# Patient Record
Sex: FEMALE | Race: BLACK OR AFRICAN AMERICAN | Employment: OTHER | URBAN - METROPOLITAN AREA
[De-identification: names, ages, dates, MRNs, and addresses within clinical notes are randomized per-mention and may not be internally consistent; named-entity substitution may affect disease eponyms.]

---

## 2018-05-30 ENCOUNTER — HOSPITAL ENCOUNTER (INPATIENT)
Age: 48
LOS: 2 days | Discharge: HOME | DRG: 760 | End: 2018-06-01
Attending: INTERNAL MEDICINE | Admitting: INTERNAL MEDICINE
Payer: MEDICARE

## 2018-05-30 ENCOUNTER — APPOINTMENT (OUTPATIENT)
Dept: GENERAL RADIOLOGY | Age: 48
DRG: 760 | End: 2018-05-30
Payer: MEDICARE

## 2018-05-30 DIAGNOSIS — R07.9 CHEST PAIN, UNSPECIFIED TYPE: ICD-10-CM

## 2018-05-30 DIAGNOSIS — F41.1 ANXIETY STATE: ICD-10-CM

## 2018-05-30 DIAGNOSIS — D64.9 ANEMIA, UNSPECIFIED TYPE: Primary | ICD-10-CM

## 2018-05-30 DIAGNOSIS — R06.02 SHORTNESS OF BREATH: ICD-10-CM

## 2018-05-30 LAB
ANION GAP SERPL CALCULATED.3IONS-SCNC: 15 MEQ/L (ref 7–13)
BACTERIA: ABNORMAL /HPF
BILIRUBIN URINE: NEGATIVE
BLOOD, URINE: ABNORMAL
BUN BLDV-MCNC: 17 MG/DL (ref 6–20)
CALCIUM SERPL-MCNC: 8.1 MG/DL (ref 8.6–10.2)
CHLORIDE BLD-SCNC: 101 MEQ/L (ref 98–107)
CLARITY: CLEAR
CO2: 23 MEQ/L (ref 22–29)
COLOR: YELLOW
CREAT SERPL-MCNC: 1.25 MG/DL (ref 0.5–0.9)
EKG ATRIAL RATE: 84 BPM
EKG P AXIS: 66 DEGREES
EKG P-R INTERVAL: 146 MS
EKG Q-T INTERVAL: 390 MS
EKG QRS DURATION: 96 MS
EKG QTC CALCULATION (BAZETT): 460 MS
EKG R AXIS: 46 DEGREES
EKG T AXIS: 57 DEGREES
EKG VENTRICULAR RATE: 84 BPM
EPITHELIAL CELLS, UA: ABNORMAL /HPF
GFR AFRICAN AMERICAN: 55.2
GFR NON-AFRICAN AMERICAN: 45.6
GLUCOSE BLD-MCNC: 93 MG/DL (ref 74–109)
GLUCOSE URINE: NEGATIVE MG/DL
KETONES, URINE: NEGATIVE MG/DL
LEUKOCYTE ESTERASE, URINE: NEGATIVE
NITRITE, URINE: NEGATIVE
PH UA: 5.5 (ref 5–9)
POTASSIUM SERPL-SCNC: 3.5 MEQ/L (ref 3.5–5.1)
PROTEIN UA: ABNORMAL MG/DL
RBC UA: ABNORMAL /HPF (ref 0–2)
RENAL EPITHELIAL, UA: ABNORMAL /HPF
SODIUM BLD-SCNC: 139 MEQ/L (ref 132–144)
SPECIFIC GRAVITY UA: 1.01 (ref 1–1.03)
TROPONIN: <0.01 NG/ML (ref 0–0.01)
URINE REFLEX TO CULTURE: YES
UROBILINOGEN, URINE: 0.2 E.U./DL
WBC UA: ABNORMAL /HPF (ref 0–5)

## 2018-05-30 PROCEDURE — 86901 BLOOD TYPING SEROLOGIC RH(D): CPT

## 2018-05-30 PROCEDURE — 36415 COLL VENOUS BLD VENIPUNCTURE: CPT

## 2018-05-30 PROCEDURE — 87077 CULTURE AEROBIC IDENTIFY: CPT

## 2018-05-30 PROCEDURE — 85025 COMPLETE CBC W/AUTO DIFF WBC: CPT

## 2018-05-30 PROCEDURE — 86850 RBC ANTIBODY SCREEN: CPT

## 2018-05-30 PROCEDURE — 80048 BASIC METABOLIC PNL TOTAL CA: CPT

## 2018-05-30 PROCEDURE — 99284 EMERGENCY DEPT VISIT MOD MDM: CPT

## 2018-05-30 PROCEDURE — 1210000000 HC MED SURG R&B

## 2018-05-30 PROCEDURE — 87086 URINE CULTURE/COLONY COUNT: CPT

## 2018-05-30 PROCEDURE — 84484 ASSAY OF TROPONIN QUANT: CPT

## 2018-05-30 PROCEDURE — 86900 BLOOD TYPING SEROLOGIC ABO: CPT

## 2018-05-30 PROCEDURE — 93005 ELECTROCARDIOGRAM TRACING: CPT

## 2018-05-30 PROCEDURE — 71046 X-RAY EXAM CHEST 2 VIEWS: CPT

## 2018-05-30 PROCEDURE — 6370000000 HC RX 637 (ALT 250 FOR IP): Performed by: NURSE PRACTITIONER

## 2018-05-30 PROCEDURE — 81001 URINALYSIS AUTO W/SCOPE: CPT

## 2018-05-30 RX ORDER — 0.9 % SODIUM CHLORIDE 0.9 %
250 INTRAVENOUS SOLUTION INTRAVENOUS ONCE
Status: COMPLETED | OUTPATIENT
Start: 2018-05-30 | End: 2018-05-31

## 2018-05-30 RX ORDER — LISINOPRIL 2.5 MG/1
2.5 TABLET ORAL DAILY
COMMUNITY
End: 2018-10-29 | Stop reason: SDUPTHER

## 2018-05-30 RX ORDER — ALPRAZOLAM 0.5 MG/1
0.5 TABLET ORAL ONCE
Status: COMPLETED | OUTPATIENT
Start: 2018-05-30 | End: 2018-05-30

## 2018-05-30 RX ADMIN — ALPRAZOLAM 0.5 MG: 0.5 TABLET ORAL at 21:57

## 2018-05-30 ASSESSMENT — ENCOUNTER SYMPTOMS
BACK PAIN: 0
COUGH: 0
TROUBLE SWALLOWING: 0
ABDOMINAL PAIN: 0
DIARRHEA: 0
SHORTNESS OF BREATH: 1
VOICE CHANGE: 0
COLOR CHANGE: 0
VOMITING: 0
CONSTIPATION: 0
SORE THROAT: 0
NAUSEA: 0

## 2018-05-30 ASSESSMENT — HEART SCORE: ECG: 0

## 2018-05-31 PROBLEM — D50.0 IRON DEFICIENCY ANEMIA DUE TO CHRONIC BLOOD LOSS: Status: ACTIVE | Noted: 2018-05-31

## 2018-05-31 PROBLEM — N92.0 MENORRHAGIA: Status: ACTIVE | Noted: 2018-05-31

## 2018-05-31 LAB
ABO/RH: NORMAL
ANION GAP SERPL CALCULATED.3IONS-SCNC: 15 MEQ/L (ref 7–13)
ANISOCYTOSIS: ABNORMAL
ANISOCYTOSIS: ABNORMAL
ANTIBODY SCREEN: NORMAL
BASOPHILS ABSOLUTE: 0 K/UL (ref 0–0.2)
BASOPHILS ABSOLUTE: 0.1 K/UL (ref 0–0.2)
BASOPHILS RELATIVE PERCENT: 1 %
BASOPHILS RELATIVE PERCENT: 1 %
BLOOD BANK DISPENSE STATUS: NORMAL
BLOOD BANK DISPENSE STATUS: NORMAL
BLOOD BANK PRODUCT CODE: NORMAL
BLOOD BANK PRODUCT CODE: NORMAL
BPU ID: NORMAL
BPU ID: NORMAL
BUN BLDV-MCNC: 16 MG/DL (ref 6–20)
CALCIUM SERPL-MCNC: 8.7 MG/DL (ref 8.6–10.2)
CHLORIDE BLD-SCNC: 104 MEQ/L (ref 98–107)
CO2: 23 MEQ/L (ref 22–29)
CREAT SERPL-MCNC: 1.29 MG/DL (ref 0.5–0.9)
DESCRIPTION BLOOD BANK: NORMAL
DESCRIPTION BLOOD BANK: NORMAL
EOSINOPHILS ABSOLUTE: 0 K/UL (ref 0–0.7)
EOSINOPHILS ABSOLUTE: 0.4 K/UL (ref 0–0.7)
EOSINOPHILS RELATIVE PERCENT: 2.2 %
EOSINOPHILS RELATIVE PERCENT: 5 %
GFR AFRICAN AMERICAN: 53.3
GFR NON-AFRICAN AMERICAN: 44
GLUCOSE BLD-MCNC: 104 MG/DL (ref 74–109)
HCT VFR BLD CALC: 24.3 % (ref 37–47)
HCT VFR BLD CALC: 25 % (ref 37–47)
HEMOGLOBIN: 7.4 G/DL (ref 12–16)
HEMOGLOBIN: 7.8 G/DL (ref 12–16)
HYPOCHROMIA: ABNORMAL
HYPOCHROMIA: ABNORMAL
LYMPHOCYTES ABSOLUTE: 1.8 K/UL (ref 1–4.8)
LYMPHOCYTES ABSOLUTE: 1.9 K/UL (ref 1–4.8)
LYMPHOCYTES RELATIVE PERCENT: 19 %
LYMPHOCYTES RELATIVE PERCENT: 23 %
MCH RBC QN AUTO: 18.1 PG (ref 27–31.3)
MCH RBC QN AUTO: 19.4 PG (ref 27–31.3)
MCHC RBC AUTO-ENTMCNC: 30.3 % (ref 33–37)
MCHC RBC AUTO-ENTMCNC: 31.1 % (ref 33–37)
MCV RBC AUTO: 59.6 FL (ref 82–100)
MCV RBC AUTO: 62.5 FL (ref 82–100)
MICROCYTES: ABNORMAL
MICROCYTES: ABNORMAL
MONOCYTES ABSOLUTE: 0.4 K/UL (ref 0.2–0.8)
MONOCYTES ABSOLUTE: 0.5 K/UL (ref 0.2–0.8)
MONOCYTES RELATIVE PERCENT: 3.6 %
MONOCYTES RELATIVE PERCENT: 5.7 %
NEUTROPHILS ABSOLUTE: 5.2 K/UL (ref 1.4–6.5)
NEUTROPHILS ABSOLUTE: 7.8 K/UL (ref 1.4–6.5)
NEUTROPHILS RELATIVE PERCENT: 66 %
NEUTROPHILS RELATIVE PERCENT: 78 %
OVALOCYTES: ABNORMAL
PDW BLD-RTO: 21.6 % (ref 11.5–14.5)
PDW BLD-RTO: 24.9 % (ref 11.5–14.5)
PLATELET # BLD: 288 K/UL (ref 130–400)
PLATELET # BLD: 292 K/UL (ref 130–400)
PLATELET SLIDE REVIEW: ADEQUATE
POIKILOCYTES: ABNORMAL
POIKILOCYTES: ABNORMAL
POTASSIUM REFLEX MAGNESIUM: 3.8 MEQ/L (ref 3.5–5.1)
RBC # BLD: 4 M/UL (ref 4.2–5.4)
RBC # BLD: 4.08 M/UL (ref 4.2–5.4)
SLIDE REVIEW: ABNORMAL
SODIUM BLD-SCNC: 142 MEQ/L (ref 132–144)
TEAR DROP CELLS: ABNORMAL
WBC # BLD: 10 K/UL (ref 4.8–10.8)
WBC # BLD: 7.9 K/UL (ref 4.8–10.8)

## 2018-05-31 PROCEDURE — 80048 BASIC METABOLIC PNL TOTAL CA: CPT

## 2018-05-31 PROCEDURE — 36415 COLL VENOUS BLD VENIPUNCTURE: CPT

## 2018-05-31 PROCEDURE — 6370000000 HC RX 637 (ALT 250 FOR IP): Performed by: INTERNAL MEDICINE

## 2018-05-31 PROCEDURE — 85025 COMPLETE CBC W/AUTO DIFF WBC: CPT

## 2018-05-31 PROCEDURE — 86923 COMPATIBILITY TEST ELECTRIC: CPT

## 2018-05-31 PROCEDURE — 36430 TRANSFUSION BLD/BLD COMPNT: CPT

## 2018-05-31 PROCEDURE — 1210000000 HC MED SURG R&B

## 2018-05-31 PROCEDURE — 2580000003 HC RX 258: Performed by: NURSE PRACTITIONER

## 2018-05-31 PROCEDURE — 2580000003 HC RX 258: Performed by: INTERNAL MEDICINE

## 2018-05-31 PROCEDURE — P9016 RBC LEUKOCYTES REDUCED: HCPCS

## 2018-05-31 PROCEDURE — 93010 ELECTROCARDIOGRAM REPORT: CPT | Performed by: INTERNAL MEDICINE

## 2018-05-31 RX ORDER — GUAIFENESIN 400 MG/1
400 TABLET ORAL 2 TIMES DAILY PRN
COMMUNITY
End: 2018-08-09 | Stop reason: ALTCHOICE

## 2018-05-31 RX ORDER — FLUTICASONE PROPIONATE 50 MCG
1 SPRAY, SUSPENSION (ML) NASAL DAILY
Status: DISCONTINUED | OUTPATIENT
Start: 2018-05-31 | End: 2018-06-01 | Stop reason: HOSPADM

## 2018-05-31 RX ORDER — BISACODYL 10 MG
10 SUPPOSITORY, RECTAL RECTAL DAILY PRN
Status: DISCONTINUED | OUTPATIENT
Start: 2018-05-31 | End: 2018-06-01 | Stop reason: HOSPADM

## 2018-05-31 RX ORDER — ACETAMINOPHEN 325 MG/1
650 TABLET ORAL EVERY 6 HOURS PRN
Status: DISCONTINUED | OUTPATIENT
Start: 2018-05-31 | End: 2018-06-01 | Stop reason: HOSPADM

## 2018-05-31 RX ORDER — 0.9 % SODIUM CHLORIDE 0.9 %
250 INTRAVENOUS SOLUTION INTRAVENOUS ONCE
Status: COMPLETED | OUTPATIENT
Start: 2018-05-31 | End: 2018-06-01

## 2018-05-31 RX ORDER — FERROUS SULFATE 325(65) MG
325 TABLET ORAL
Qty: 30 TABLET | Refills: 3 | Status: SHIPPED | OUTPATIENT
Start: 2018-05-31 | End: 2018-06-01

## 2018-05-31 RX ORDER — DOCUSATE SODIUM 100 MG/1
100 CAPSULE, LIQUID FILLED ORAL DAILY
Status: DISCONTINUED | OUTPATIENT
Start: 2018-06-01 | End: 2018-06-01 | Stop reason: HOSPADM

## 2018-05-31 RX ORDER — SODIUM PHOSPHATE, DIBASIC AND SODIUM PHOSPHATE, MONOBASIC 7; 19 G/133ML; G/133ML
1 ENEMA RECTAL DAILY PRN
Status: DISCONTINUED | OUTPATIENT
Start: 2018-05-31 | End: 2018-06-01 | Stop reason: HOSPADM

## 2018-05-31 RX ORDER — LISINOPRIL 5 MG/1
5 TABLET ORAL DAILY
Status: DISCONTINUED | OUTPATIENT
Start: 2018-05-31 | End: 2018-06-01 | Stop reason: HOSPADM

## 2018-05-31 RX ORDER — ONDANSETRON 2 MG/ML
4 INJECTION INTRAMUSCULAR; INTRAVENOUS EVERY 6 HOURS PRN
Status: DISCONTINUED | OUTPATIENT
Start: 2018-05-31 | End: 2018-06-01 | Stop reason: HOSPADM

## 2018-05-31 RX ORDER — HYDRALAZINE HYDROCHLORIDE 20 MG/ML
10 INJECTION INTRAMUSCULAR; INTRAVENOUS EVERY 4 HOURS PRN
Status: DISCONTINUED | OUTPATIENT
Start: 2018-05-31 | End: 2018-06-01 | Stop reason: HOSPADM

## 2018-05-31 RX ADMIN — FLUTICASONE PROPIONATE 1 SPRAY: 50 SPRAY, METERED NASAL at 09:32

## 2018-05-31 RX ADMIN — MAGNESIUM HYDROXIDE 30 ML: 400 SUSPENSION ORAL at 23:49

## 2018-05-31 RX ADMIN — SODIUM CHLORIDE 250 ML: 9 INJECTION, SOLUTION INTRAVENOUS at 03:30

## 2018-05-31 RX ADMIN — SODIUM CHLORIDE 250 ML: 9 INJECTION, SOLUTION INTRAVENOUS at 13:26

## 2018-05-31 RX ADMIN — ACETAMINOPHEN 650 MG: 325 TABLET ORAL at 23:48

## 2018-05-31 RX ADMIN — LISINOPRIL 5 MG: 5 TABLET ORAL at 09:29

## 2018-05-31 ASSESSMENT — PAIN SCALES - GENERAL
PAINLEVEL_OUTOF10: 0
PAINLEVEL_OUTOF10: 3

## 2018-06-01 VITALS
HEIGHT: 66 IN | HEART RATE: 79 BPM | TEMPERATURE: 98.4 F | DIASTOLIC BLOOD PRESSURE: 65 MMHG | RESPIRATION RATE: 16 BRPM | OXYGEN SATURATION: 100 % | SYSTOLIC BLOOD PRESSURE: 116 MMHG | WEIGHT: 225.31 LBS | BODY MASS INDEX: 36.21 KG/M2

## 2018-06-01 LAB
ANISOCYTOSIS: ABNORMAL
BASOPHILS ABSOLUTE: 0.1 K/UL (ref 0–0.2)
BASOPHILS RELATIVE PERCENT: 1 %
EOSINOPHILS ABSOLUTE: 0.2 K/UL (ref 0–0.7)
EOSINOPHILS RELATIVE PERCENT: 3 %
HCT VFR BLD CALC: 26.9 % (ref 37–47)
HEMOGLOBIN: 8.7 G/DL (ref 12–16)
HYPOCHROMIA: ABNORMAL
LYMPHOCYTES ABSOLUTE: 2 K/UL (ref 1–4.8)
LYMPHOCYTES RELATIVE PERCENT: 25 %
MCH RBC QN AUTO: 21.1 PG (ref 27–31.3)
MCHC RBC AUTO-ENTMCNC: 32.5 % (ref 33–37)
MCV RBC AUTO: 64.8 FL (ref 82–100)
MICROCYTES: ABNORMAL
MONOCYTES ABSOLUTE: 0.1 K/UL (ref 0.2–0.8)
MONOCYTES RELATIVE PERCENT: 0.9 %
NEUTROPHILS ABSOLUTE: 5.6 K/UL (ref 1.4–6.5)
NEUTROPHILS RELATIVE PERCENT: 71 %
ORGANISM: ABNORMAL
PDW BLD-RTO: 27 % (ref 11.5–14.5)
PLATELET # BLD: 250 K/UL (ref 130–400)
PLATELET SLIDE REVIEW: NORMAL
POIKILOCYTES: ABNORMAL
POLYCHROMASIA: ABNORMAL
RBC # BLD: 4.14 M/UL (ref 4.2–5.4)
TEAR DROP CELLS: ABNORMAL
URINE CULTURE, ROUTINE: ABNORMAL
URINE CULTURE, ROUTINE: ABNORMAL
WBC # BLD: 7.9 K/UL (ref 4.8–10.8)

## 2018-06-01 PROCEDURE — 85025 COMPLETE CBC W/AUTO DIFF WBC: CPT

## 2018-06-01 PROCEDURE — 36415 COLL VENOUS BLD VENIPUNCTURE: CPT

## 2018-06-01 PROCEDURE — 6360000002 HC RX W HCPCS: Performed by: INTERNAL MEDICINE

## 2018-06-01 PROCEDURE — 2580000003 HC RX 258: Performed by: INTERNAL MEDICINE

## 2018-06-01 PROCEDURE — 6370000000 HC RX 637 (ALT 250 FOR IP): Performed by: INTERNAL MEDICINE

## 2018-06-01 RX ORDER — FLUTICASONE PROPIONATE 50 MCG
1 SPRAY, SUSPENSION (ML) NASAL DAILY
Qty: 1 BOTTLE | Refills: 1 | Status: SHIPPED | OUTPATIENT
Start: 2018-06-01 | End: 2019-02-07

## 2018-06-01 RX ORDER — FERROUS SULFATE 325(65) MG
325 TABLET ORAL 2 TIMES DAILY
Qty: 30 TABLET | Refills: 3 | Status: SHIPPED | OUTPATIENT
Start: 2018-06-01 | End: 2018-11-28 | Stop reason: SDUPTHER

## 2018-06-01 RX ADMIN — SODIUM CHLORIDE 125 MG: 9 INJECTION, SOLUTION INTRAVENOUS at 06:35

## 2018-06-01 RX ADMIN — LISINOPRIL 5 MG: 5 TABLET ORAL at 09:55

## 2018-06-01 RX ADMIN — DOCUSATE SODIUM 100 MG: 100 CAPSULE, LIQUID FILLED ORAL at 09:56

## 2018-06-01 RX ADMIN — FLUTICASONE PROPIONATE 1 SPRAY: 50 SPRAY, METERED NASAL at 09:56

## 2018-06-01 ASSESSMENT — PAIN SCALES - GENERAL: PAINLEVEL_OUTOF10: 0

## 2018-08-09 ENCOUNTER — OFFICE VISIT (OUTPATIENT)
Dept: INTERNAL MEDICINE | Age: 48
End: 2018-08-09
Payer: MEDICARE

## 2018-08-09 VITALS
OXYGEN SATURATION: 98 % | DIASTOLIC BLOOD PRESSURE: 91 MMHG | SYSTOLIC BLOOD PRESSURE: 173 MMHG | HEART RATE: 91 BPM | HEIGHT: 66 IN | WEIGHT: 217 LBS | BODY MASS INDEX: 34.87 KG/M2

## 2018-08-09 DIAGNOSIS — N92.0 MENORRHAGIA WITH REGULAR CYCLE: ICD-10-CM

## 2018-08-09 DIAGNOSIS — C80.1 CANCER (HCC): ICD-10-CM

## 2018-08-09 DIAGNOSIS — D64.9 CHRONIC ANEMIA: ICD-10-CM

## 2018-08-09 DIAGNOSIS — I10 ESSENTIAL HYPERTENSION: Primary | ICD-10-CM

## 2018-08-09 DIAGNOSIS — D50.0 IRON DEFICIENCY ANEMIA DUE TO CHRONIC BLOOD LOSS: ICD-10-CM

## 2018-08-09 DIAGNOSIS — D25.9 UTERINE LEIOMYOMA, UNSPECIFIED LOCATION: ICD-10-CM

## 2018-08-09 DIAGNOSIS — N28.9 KIDNEY DISEASE: ICD-10-CM

## 2018-08-09 PROCEDURE — G8417 CALC BMI ABV UP PARAM F/U: HCPCS | Performed by: FAMILY MEDICINE

## 2018-08-09 PROCEDURE — G8427 DOCREV CUR MEDS BY ELIG CLIN: HCPCS | Performed by: FAMILY MEDICINE

## 2018-08-09 PROCEDURE — 1036F TOBACCO NON-USER: CPT | Performed by: FAMILY MEDICINE

## 2018-08-09 PROCEDURE — 99203 OFFICE O/P NEW LOW 30 MIN: CPT | Performed by: FAMILY MEDICINE

## 2018-08-09 RX ORDER — DILTIAZEM HYDROCHLORIDE 90 MG/1
90 CAPSULE, EXTENDED RELEASE ORAL 2 TIMES DAILY
Qty: 60 CAPSULE | Refills: 0 | Status: SHIPPED | OUTPATIENT
Start: 2018-08-09 | End: 2018-08-09 | Stop reason: SDUPTHER

## 2018-08-09 RX ORDER — PREDNISONE 20 MG/1
20 TABLET ORAL DAILY
COMMUNITY
End: 2018-10-02 | Stop reason: ALTCHOICE

## 2018-08-09 RX ORDER — DIPHENHYDRAMINE HCL 25 MG
25 CAPSULE ORAL 3 TIMES DAILY PRN
COMMUNITY
End: 2018-10-02 | Stop reason: ALTCHOICE

## 2018-08-09 ASSESSMENT — PATIENT HEALTH QUESTIONNAIRE - PHQ9
1. LITTLE INTEREST OR PLEASURE IN DOING THINGS: 0
SUM OF ALL RESPONSES TO PHQ9 QUESTIONS 1 & 2: 0
SUM OF ALL RESPONSES TO PHQ QUESTIONS 1-9: 0
SUM OF ALL RESPONSES TO PHQ QUESTIONS 1-9: 0
2. FEELING DOWN, DEPRESSED OR HOPELESS: 0

## 2018-08-09 NOTE — PROGRESS NOTES
resolved. Vit D def-on supplement-follow labs      Ductal carcinoma   Oncology Assessment and Plan :    Premenopausal -American female with clinical stage III (T3, N1 ) left breast cancer (estrogen receptor negative, progesterone receptor negative, and HER-2/mecca positive) diagnosed in 2014, status post neoadjuvant chemotherapy with [address] from 8/29/2014 to January 2015, status post left lumpectomy on February 27, 2014, followed by adjuvant radiation therapy from June 2015 to August 24, 2015. She had axillary progression/recurrence occurring before completion of planned initial treatment. She was diagnosed with left axillary recurrence via biopsy on 10/1/2015. On November 17, 2015, she underwent axillary lymph node dissection by Dr. Teressa Sloan. Her left intrapectoral lymph node is involved. Three other left axillary lymph nodes were not involved. Adjuvant radiation therapy was recommended. Patient declined. Given her progression so soon after trastuzumab therapy and during radiation therapy, ado-trastuzumab emtansine (Kadcyla) 3.6 mg/kg every 3 weeks for at least 1 year was recommended. Received her first cycle of Kadcyla on February 4, 2016.  Status post 3 cycles; last treatment 6/7/16. Patient was non-compliant and decided not to receive further treatment. Mammogram 7/13/17 : negative; continue diagnostic mammogram q 6-12 months. CBE every 3-6 months; SBE at least once a month. CT c/a/p (negative for mets 6/29/17) and bone scan (patient decided not to get this done); plan for repeat scans every 6-12 months or earlier as indicated. Monitor CBC diff and CMP q 3 months  Echo EF = 59 ± 5% 01/2017, cardiology referral made for pericardial effusion and h/o cardiotoxic chemotherapy (patient is yet to schedule appointment)    Review of Systems  Constitutional: Negative for fatigue, fever and sweats. HEENT: Negative for eye discharge and vision loss.  Negative for ear drainage, hearing loss and nasal drainage. Respiratory: Negative for cough, dyspnea and wheezing. Cardiovascular:  Negative for chest pain, claudication and irregular heartbeat/palpitations. Physical Exam  Vitals:    08/09/18 1001   BP: (!) 173/91   Pulse:    SpO2:    Body mass index is 35.02 kg/m². Physical Exam  Constitutional:  Appears well-developed and well-nourished. No distress. HENT:   Head: Normocephalic and atraumatic. Right Ear: External ear normal.   Left Ear: External ear normal.   Nose: Nose normal.   Eyes: Conjunctivae and EOM are normal.  Right eye exhibits no discharge. Left eye exhibits no discharge. No scleral icterus. Neck: Normal range of motion. Cardiovascular: Normal rate, regular rhythm and normal heart sounds. No murmur heard. Pulmonary/Chest: Effort normal and breath sounds normal. No respiratory distress. no wheezes. no rales. no tenderness. Musculoskeletal: Normal range of motion. Neurological: alert. Skin: not diaphoretic. Psychiatric: normal mood and affect. behavior is normal. Judgment and thought content normal.   Nursing note and vitals reviewed. Assessment:  Mat Chavez was seen today for establish care and referral - general.    Diagnoses and all orders for this visit:    Essential hypertension  -     diltiazem (CARDIZEM 12 HR) 90 MG extended release capsule; Take 1 capsule by mouth 2 times daily  Noncompliant, refuses to take any medications prescribed in the past, she says they do not work  Had a lengthy discussion with the patient regarding importance treatment for high blood pressure and risks involved including but not limited to worsening kidney disease and stroke  We will try a calcium channel blocker since she is -American, she did not do well on Norvasc.   Trial of Cardizem  Given blood pressure logs to keep blood pressure checks at home once a day    Kidney disease  Stable    Uterine leiomyoma, unspecified location  -     Ambulatory referral to Obstetrics /

## 2018-08-13 RX ORDER — DILTIAZEM HYDROCHLORIDE 180 MG/1
180 CAPSULE, COATED, EXTENDED RELEASE ORAL DAILY
Qty: 30 CAPSULE | Refills: 0 | OUTPATIENT
Start: 2018-08-13 | End: 2018-11-28 | Stop reason: ALTCHOICE

## 2018-09-06 ENCOUNTER — OFFICE VISIT (OUTPATIENT)
Dept: OBGYN CLINIC | Age: 48
End: 2018-09-06
Payer: MEDICARE

## 2018-09-06 VITALS
HEART RATE: 88 BPM | BODY MASS INDEX: 34.72 KG/M2 | WEIGHT: 216 LBS | DIASTOLIC BLOOD PRESSURE: 78 MMHG | SYSTOLIC BLOOD PRESSURE: 144 MMHG | HEIGHT: 66 IN

## 2018-09-06 DIAGNOSIS — D50.0 IRON DEFICIENCY ANEMIA DUE TO CHRONIC BLOOD LOSS: ICD-10-CM

## 2018-09-06 DIAGNOSIS — D25.1 INTRAMURAL AND SUBMUCOUS LEIOMYOMA OF UTERUS: ICD-10-CM

## 2018-09-06 DIAGNOSIS — D25.0 INTRAMURAL AND SUBMUCOUS LEIOMYOMA OF UTERUS: ICD-10-CM

## 2018-09-06 DIAGNOSIS — N92.1 MENORRHAGIA WITH IRREGULAR CYCLE: Primary | ICD-10-CM

## 2018-09-06 LAB
BILIRUBIN, POC: NORMAL
BLOOD URINE, POC: NORMAL
CLARITY, POC: CLEAR
COLOR, POC: YELLOW
GLUCOSE URINE, POC: NORMAL
KETONES, POC: NORMAL
LEUKOCYTE EST, POC: NORMAL
NITRITE, POC: NORMAL
PH, POC: 5.5
PROTEIN, POC: NORMAL
SPECIFIC GRAVITY, POC: 1.02
UROBILINOGEN, POC: NORMAL

## 2018-09-06 PROCEDURE — G8427 DOCREV CUR MEDS BY ELIG CLIN: HCPCS | Performed by: OBSTETRICS & GYNECOLOGY

## 2018-09-06 PROCEDURE — G8417 CALC BMI ABV UP PARAM F/U: HCPCS | Performed by: OBSTETRICS & GYNECOLOGY

## 2018-09-06 PROCEDURE — 81002 URINALYSIS NONAUTO W/O SCOPE: CPT | Performed by: OBSTETRICS & GYNECOLOGY

## 2018-09-06 PROCEDURE — 1036F TOBACCO NON-USER: CPT | Performed by: OBSTETRICS & GYNECOLOGY

## 2018-09-06 PROCEDURE — 99203 OFFICE O/P NEW LOW 30 MIN: CPT | Performed by: OBSTETRICS & GYNECOLOGY

## 2018-09-06 ASSESSMENT — ENCOUNTER SYMPTOMS
APNEA: 0
ABDOMINAL PAIN: 1
ABDOMINAL DISTENTION: 0
SHORTNESS OF BREATH: 0
DIARRHEA: 0
CONSTIPATION: 0

## 2018-09-06 NOTE — PROGRESS NOTES
Subjective:      Patient ID:  Abril Kennedy is a 49 y.o. female with chief complaint of:  Chief Complaint   Patient presents with    Fibroids     Referred by Dr. Angelene Nyhan       Patient presents as referral secondary to long standing history of DUB secondary to uterine fibroids. Patient is a breast cancer survivor stage 3 invasive ductal carcinoma of left breast. Initial surgery was done locally and an second surgery was done guero to positive margins. Patient followed later at [facility] secodnary to positive lymph node noted at radiation therapy. She is s/p chemotherapy and radiation. Patient has always been bothered by heavy unpredictable bleeding  Resulting in transfusions.  Patient is also bothered by persistent pelvic discomfort due to uterine size        Past Medical History:   Diagnosis Date    Anemia     Cancer ([address] 14    Invasive ductal cancer left breast.     Fibroids     Hernia, diaphragmatic 2013    Left side    Hypertension     Kidney disease     Menses regular with excessive bleeding     Obesity 3/15/13    BMI 33.25     Past Surgical History:   Procedure Laterality Date    BREAST LUMPECTOMY Left 3/31/15    reexcision medial margin left breast lumpectomy site    BREAST SURGERY  14    Left Breast U/S Guided core bx    BREAST SURGERY Left 02/27/15    lumpectomy after wire localization     SECTION      x 3    FINE NEEDLE ASPIRATION  09/16/15-    US GUIDED-LYMPH NODE    HIATAL HERNIA REPAIR      OTHER SURGICAL HISTORY Left 09/26/15    EXCISION AX LYMPH NODE W/FROZ SECT/DISSECT     Family History   Problem Relation Age of Onset    High Blood Pressure Mother     High Blood Pressure Father     Breast Cancer Maternal Grandmother     High Blood Pressure Maternal Grandmother     Diabetes Maternal Grandmother     Cancer Maternal Grandfather         lung cancer smoker    Colon Cancer Paternal Grandmother     Heart Disease Paternal Grandfather      Current Outpatient Prescriptions on

## 2018-10-02 ENCOUNTER — OFFICE VISIT (OUTPATIENT)
Dept: OBGYN CLINIC | Age: 48
End: 2018-10-02
Payer: MEDICARE

## 2018-10-02 VITALS
SYSTOLIC BLOOD PRESSURE: 148 MMHG | HEIGHT: 66 IN | DIASTOLIC BLOOD PRESSURE: 98 MMHG | WEIGHT: 216 LBS | BODY MASS INDEX: 34.72 KG/M2

## 2018-10-02 DIAGNOSIS — D39.2 NEOPLASM OF UNCERTAIN BEHAVIOR OF PLACENTA: ICD-10-CM

## 2018-10-02 DIAGNOSIS — R53.83 FATIGUE, UNSPECIFIED TYPE: ICD-10-CM

## 2018-10-02 DIAGNOSIS — D50.8 OTHER IRON DEFICIENCY ANEMIA: ICD-10-CM

## 2018-10-02 DIAGNOSIS — Z12.11 COLON CANCER SCREENING: ICD-10-CM

## 2018-10-02 DIAGNOSIS — N93.8 DUB (DYSFUNCTIONAL UTERINE BLEEDING): ICD-10-CM

## 2018-10-02 DIAGNOSIS — Z11.51 SCREENING FOR HPV (HUMAN PAPILLOMAVIRUS): ICD-10-CM

## 2018-10-02 DIAGNOSIS — Z01.419 ENCOUNTER FOR WELL WOMAN EXAM WITH ROUTINE GYNECOLOGICAL EXAM: Primary | ICD-10-CM

## 2018-10-02 LAB
ANISOCYTOSIS: ABNORMAL
BASOPHILS ABSOLUTE: 0.1 K/UL (ref 0–0.2)
BASOPHILS RELATIVE PERCENT: 0.7 %
EOSINOPHILS ABSOLUTE: 0.1 K/UL (ref 0–0.7)
EOSINOPHILS RELATIVE PERCENT: 1.8 %
FOLLICLE STIMULATING HORMONE: 44.6 MIU/ML
GONADOTROPIN, CHORIONIC (HCG) QUANT: 0.4 MIU/ML
HCT VFR BLD CALC: 24.3 % (ref 37–47)
HEMOGLOBIN: 7.5 G/DL (ref 12–16)
HYPOCHROMIA: ABNORMAL
LUTEINIZING HORMONE: 30.8 MIU/ML
LYMPHOCYTES ABSOLUTE: 2.5 K/UL (ref 1–4.8)
LYMPHOCYTES RELATIVE PERCENT: 29.7 %
MCH RBC QN AUTO: 19.4 PG (ref 27–31.3)
MCHC RBC AUTO-ENTMCNC: 30.9 % (ref 33–37)
MCV RBC AUTO: 62.7 FL (ref 82–100)
MICROCYTES: ABNORMAL
MONOCYTES ABSOLUTE: 0.5 K/UL (ref 0.2–0.8)
MONOCYTES RELATIVE PERCENT: 6 %
NEUTROPHILS ABSOLUTE: 5.2 K/UL (ref 1.4–6.5)
NEUTROPHILS RELATIVE PERCENT: 61.8 %
PDW BLD-RTO: 21 % (ref 11.5–14.5)
PLATELET # BLD: 313 K/UL (ref 130–400)
POIKILOCYTES: ABNORMAL
POLYCHROMASIA: ABNORMAL
PROLACTIN: 9.1 NG/ML
RBC # BLD: 3.88 M/UL (ref 4.2–5.4)
T4 FREE: 1.22 NG/DL (ref 0.93–1.7)
TARGET CELLS: ABNORMAL
TSH SERPL DL<=0.05 MIU/L-ACNC: 2.82 UIU/ML (ref 0.27–4.2)
WBC # BLD: 8.1 K/UL (ref 4.8–10.8)

## 2018-10-02 PROCEDURE — G8417 CALC BMI ABV UP PARAM F/U: HCPCS | Performed by: OBSTETRICS & GYNECOLOGY

## 2018-10-02 PROCEDURE — G8484 FLU IMMUNIZE NO ADMIN: HCPCS | Performed by: OBSTETRICS & GYNECOLOGY

## 2018-10-02 PROCEDURE — G8427 DOCREV CUR MEDS BY ELIG CLIN: HCPCS | Performed by: OBSTETRICS & GYNECOLOGY

## 2018-10-02 PROCEDURE — 99215 OFFICE O/P EST HI 40 MIN: CPT | Performed by: OBSTETRICS & GYNECOLOGY

## 2018-10-02 PROCEDURE — G0101 CA SCREEN;PELVIC/BREAST EXAM: HCPCS | Performed by: OBSTETRICS & GYNECOLOGY

## 2018-10-02 PROCEDURE — 1036F TOBACCO NON-USER: CPT | Performed by: OBSTETRICS & GYNECOLOGY

## 2018-10-02 RX ORDER — LISINOPRIL AND HYDROCHLOROTHIAZIDE 25; 20 MG/1; MG/1
TABLET ORAL
COMMUNITY
Start: 2018-10-01 | End: 2019-02-07

## 2018-10-02 ASSESSMENT — ENCOUNTER SYMPTOMS
WHEEZING: 0
BLOOD IN STOOL: 0
VOMITING: 0
SINUS PRESSURE: 0
SHORTNESS OF BREATH: 0
COLOR CHANGE: 0
NAUSEA: 0
CONSTIPATION: 0
ABDOMINAL PAIN: 0
COUGH: 0

## 2018-10-02 NOTE — PROGRESS NOTES
Quantitative, Pregnancy    Luteinizing Hormone    Prolactin    TSH without Reflex    T4, Free    Miscellaneous lab test #3   4. Colon cancer screening  Ambulatory referral to Gastroenterology   5. Neoplasm of uncertain behavior of placenta   HCG, Quantitative, Pregnancy   6. Other iron deficiency anemia  TSH without Reflex    T4, Free   7.  Fatigue, unspecified type  TSH without Reflex    T4, Free                    Counseling Completed:          PLAN:      Orders Placed This Encounter   Procedures    US Non OB Transvaginal     Standing Status:   Future     Standing Expiration Date:   10/2/2019    CBC Auto Differential     Standing Status:   Future     Number of Occurrences:   1     Standing Expiration Date:   40/1/3589    Follicle Stimulating Hormone     Standing Status:   Future     Number of Occurrences:   1     Standing Expiration Date:   10/2/2019    HCG, Quantitative, Pregnancy     Standing Status:   Future     Number of Occurrences:   1     Standing Expiration Date:   10/2/2019    Luteinizing Hormone     Standing Status:   Future     Number of Occurrences:   1     Standing Expiration Date:   10/2/2019    Prolactin     Standing Status:   Future     Number of Occurrences:   1     Standing Expiration Date:   10/2/2019    TSH without Reflex     Standing Status:   Future     Number of Occurrences:   1     Standing Expiration Date:   10/2/2019    T4, Free     Standing Status:   Future     Number of Occurrences:   1     Standing Expiration Date:   10/2/2019    Miscellaneous lab test #3     Genital cultures     Standing Status:   Future     Standing Expiration Date:   10/2/2019    Ambulatory referral to Gastroenterology     Referral Priority:   Routine     Referral Type:   Consult for Advice and Opinion     Referral Reason:   Specialty Services Required     Referred to Provider:   Ana Barnard MD     Requested Specialty:   Gastroenterology     Number of Visits Requested:   1     No orders of the defined

## 2018-10-09 ENCOUNTER — TELEPHONE (OUTPATIENT)
Dept: OBGYN CLINIC | Age: 48
End: 2018-10-09

## 2018-10-09 DIAGNOSIS — N93.8 DUB (DYSFUNCTIONAL UTERINE BLEEDING): ICD-10-CM

## 2018-10-10 RX ORDER — CLINDAMYCIN PHOSPHATE 20 MG/G
CREAM VAGINAL
Qty: 1 TUBE | Refills: 0 | Status: SHIPPED | OUTPATIENT
Start: 2018-10-10 | End: 2018-10-17

## 2018-10-10 RX ORDER — FERROUS SULFATE 325(65) MG
325 TABLET ORAL
Qty: 90 TABLET | Refills: 3 | Status: SHIPPED | OUTPATIENT
Start: 2018-10-10 | End: 2019-02-07

## 2018-10-19 LAB
ANION GAP SERPL CALCULATED.3IONS-SCNC: 15 MMOL/L (ref 10–20)
BICARBONATE: 27 MMOL/L (ref 21–32)
BUN / CREAT RATIO: 10 (ref 5–25)
CALCIUM SERPL-MCNC: 9.1 MG/DL (ref 8.6–10.3)
CHLORIDE BLD-SCNC: 103 MMOL/L (ref 98–107)
CREAT SERPL-MCNC: 1.17 MG/DL (ref 0.5–1.05)
GFR CALCULATED: 60
GLUCOSE: 116 MG/DL (ref 70–100)
HCT VFR BLD CALC: 27 % (ref 36.5–46.6)
HEMOGLOBIN: 7.9 G/DL (ref 11.8–15.3)
POTASSIUM SERPL-SCNC: 3.7 MMOL/L (ref 3.5–5.1)
SODIUM BLD-SCNC: 141 MMOL/L (ref 136–145)
UREA NITROGEN: 12 MG/DL (ref 6–23)

## 2018-10-21 LAB
HCT VFR BLD CALC: 27.6 % (ref 36.5–46.6)
HEMOGLOBIN: 7.9 G/DL (ref 11.8–15.3)

## 2018-10-22 ENCOUNTER — TELEPHONE (OUTPATIENT)
Dept: INTERNAL MEDICINE | Age: 48
End: 2018-10-22

## 2018-10-23 ENCOUNTER — TELEPHONE (OUTPATIENT)
Dept: INTERNAL MEDICINE | Age: 48
End: 2018-10-23

## 2018-10-26 ENCOUNTER — TELEPHONE (OUTPATIENT)
Dept: INTERNAL MEDICINE | Age: 48
End: 2018-10-26

## 2018-10-29 ENCOUNTER — OFFICE VISIT (OUTPATIENT)
Dept: INTERNAL MEDICINE | Age: 48
End: 2018-10-29
Payer: MEDICARE

## 2018-10-29 ENCOUNTER — TELEPHONE (OUTPATIENT)
Dept: INTERNAL MEDICINE | Age: 48
End: 2018-10-29

## 2018-10-29 VITALS
RESPIRATION RATE: 16 BRPM | TEMPERATURE: 97.5 F | SYSTOLIC BLOOD PRESSURE: 150 MMHG | HEART RATE: 74 BPM | OXYGEN SATURATION: 99 % | DIASTOLIC BLOOD PRESSURE: 90 MMHG | BODY MASS INDEX: 33.91 KG/M2 | WEIGHT: 211 LBS | HEIGHT: 66 IN

## 2018-10-29 DIAGNOSIS — I10 UNCONTROLLED HYPERTENSION: ICD-10-CM

## 2018-10-29 DIAGNOSIS — M10.9 ACUTE GOUT OF KNEE, UNSPECIFIED CAUSE, UNSPECIFIED LATERALITY: Primary | ICD-10-CM

## 2018-10-29 DIAGNOSIS — M10.9 GOUT, ARTHRITIS: ICD-10-CM

## 2018-10-29 PROCEDURE — 1111F DSCHRG MED/CURRENT MED MERGE: CPT | Performed by: PHYSICIAN ASSISTANT

## 2018-10-29 PROCEDURE — 99495 TRANSJ CARE MGMT MOD F2F 14D: CPT | Performed by: PHYSICIAN ASSISTANT

## 2018-10-29 RX ORDER — OXYCODONE HYDROCHLORIDE AND ACETAMINOPHEN 5; 325 MG/1; MG/1
TABLET ORAL
Refills: 0 | COMMUNITY
Start: 2018-10-25 | End: 2018-11-28 | Stop reason: ALTCHOICE

## 2018-10-29 RX ORDER — METHYLPREDNISOLONE 4 MG/1
TABLET ORAL
Refills: 0 | COMMUNITY
Start: 2018-10-25 | End: 2018-11-28 | Stop reason: ALTCHOICE

## 2018-10-29 RX ORDER — LISINOPRIL 20 MG/1
20 TABLET ORAL DAILY
Qty: 30 TABLET | Refills: 1 | Status: SHIPPED | OUTPATIENT
Start: 2018-10-29 | End: 2019-02-07

## 2018-10-29 NOTE — TELEPHONE ENCOUNTER
[facility] Initial Follow Up Call    Outreach made within 2 business days of discharge: Yes    Patient: Adryan Hyde Patient : 1970   MRN: [id number]  Reason for Admission: gout  Discharge Date: 10/25/2018       Spoke with: patient    Discharge department/facility: [facility]  TCM Interactive Patient Contact:  Was patient able to fill all prescriptions: Yes  Was patient instructed to bring all medications to the follow-up visit: Yes  Is patient taking all medications as directed in the discharge summary?  Yes  Does patient understand their discharge instructions: Yes  Does patient have questions or concerns that need addressed prior to 7-14 day follow up office visit: no    Scheduled appointment with PCP within 7-14 days    Follow Up  Future Appointments  Date Time Provider Sagrario Huertasi   10/29/2018 1:00 PM RINKU [address]   2018 9:30 AM Silvia Wang MD [place], [place]

## 2018-11-06 ENCOUNTER — TELEPHONE (OUTPATIENT)
Dept: OBGYN CLINIC | Age: 48
End: 2018-11-06

## 2018-11-28 ENCOUNTER — OFFICE VISIT (OUTPATIENT)
Dept: INTERNAL MEDICINE | Age: 48
End: 2018-11-28
Payer: MEDICARE

## 2018-11-28 VITALS
TEMPERATURE: 98.7 F | BODY MASS INDEX: 32.56 KG/M2 | RESPIRATION RATE: 16 BRPM | SYSTOLIC BLOOD PRESSURE: 126 MMHG | WEIGHT: 202.6 LBS | DIASTOLIC BLOOD PRESSURE: 72 MMHG | HEIGHT: 66 IN | OXYGEN SATURATION: 99 % | HEART RATE: 91 BPM

## 2018-11-28 DIAGNOSIS — E61.1 IRON DEFICIENCY: ICD-10-CM

## 2018-11-28 DIAGNOSIS — R05.9 COUGH: ICD-10-CM

## 2018-11-28 DIAGNOSIS — J06.9 VIRAL URI WITH COUGH: Primary | ICD-10-CM

## 2018-11-28 PROCEDURE — 99214 OFFICE O/P EST MOD 30 MIN: CPT | Performed by: FAMILY MEDICINE

## 2018-11-28 PROCEDURE — 1036F TOBACCO NON-USER: CPT | Performed by: FAMILY MEDICINE

## 2018-11-28 PROCEDURE — G8427 DOCREV CUR MEDS BY ELIG CLIN: HCPCS | Performed by: FAMILY MEDICINE

## 2018-11-28 PROCEDURE — G8484 FLU IMMUNIZE NO ADMIN: HCPCS | Performed by: FAMILY MEDICINE

## 2018-11-28 PROCEDURE — G8417 CALC BMI ABV UP PARAM F/U: HCPCS | Performed by: FAMILY MEDICINE

## 2018-11-28 RX ORDER — AMOXICILLIN 500 MG/1
CAPSULE ORAL
Refills: 0 | COMMUNITY
Start: 2018-11-19 | End: 2019-02-07

## 2018-11-28 ASSESSMENT — ENCOUNTER SYMPTOMS
SORE THROAT: 0
APNEA: 0
SHORTNESS OF BREATH: 0
SINUS PAIN: 0
WHEEZING: 0
BACK PAIN: 0
COUGH: 1

## 2018-11-28 NOTE — PROGRESS NOTES
Patient: Varsha Caldera    YOB: 1970    Date: 18    Patient Active Problem List    Diagnosis Date Noted    Gout, arthritis 10/29/2018    Kidney disease     Fibroids     Menorrhagia 2018    Iron deficiency anemia due to chronic blood loss 2018    Chronic anemia 2018    Uterine leiomyoma 2015    Disorder of paranasal sinus 2015    [facility]     Overview Note:     Invasive ductal cancer left breast.       Hypertension     Anemia     Iron deficiency 10/25/2012    Hearing loss 10/25/2012     Overview Note:     Overview:   Right ear, since childhood      Vitamin D deficiency 2011    Adiposity 2011    Postnasal drip 2011    Asymmetrical sensorineural hearing loss 2011    Contact dermatitis 2009     Past Medical History:   Diagnosis Date    Anemia     [facility]    Invasive ductal cancer left breast.     Fibroids     Hernia, diaphragmatic 2013    Left side    Hypertension     Kidney disease     Menses regular with excessive bleeding     Obesity 3/15/13    BMI 33.25     Past Surgical History:   Procedure Laterality Date    BREAST LUMPECTOMY Left 3/31/15    reexcision medial margin left breast lumpectomy site    BREAST SURGERY  14    Left Breast U/S Guided core bx    BREAST SURGERY Left 02/27/15    lumpectomy after wire localization     SECTION      x 3    FINE NEEDLE ASPIRATION  09/16/15-    US GUIDED-LYMPH NODE    HIATAL HERNIA REPAIR      OTHER SURGICAL HISTORY Left 09/26/15    EXCISION AX LYMPH NODE W/FROZ SECT/DISSECT     Family History   Problem Relation Age of Onset    High Blood Pressure Mother     High Blood Pressure Father     Breast Cancer Maternal Grandmother     High Blood Pressure Maternal Grandmother     Diabetes Maternal Grandmother     Cancer Maternal Grandfather         lung cancer smoker    Colon Cancer Paternal Grandmother     Heart Disease Paternal factors:Vicodin got from the ER had helped  Associated signs & symptoms:  She is still choking on the cough coming up  Phlegm is clear but still bothersome  She had some vertigo yesterday   No fevers    She does have a history of breast cancer currently seeing oncology for this, needs hysterectomy for symptomatic fibroids but this is on hold due to ongoing cancer treatment. Also noted to be extremely anemic and had to be in the hospital for blood transfusions, anemia suspected to be secondary to iron deficiency from chronic blood loss due to her fibroids. However, patient is having a colonoscopy next month. Review of Systems   Constitutional: Positive for chills and fatigue. Negative for activity change and appetite change. HENT: Positive for congestion and ear pain. Negative for sinus pain and sore throat. Respiratory: Positive for cough. Negative for apnea, shortness of breath and wheezing. Musculoskeletal: Negative for arthralgias, back pain, myalgias and neck pain. Physical Exam  Vitals:    11/28/18 1408   BP: 126/72   Pulse: 91   Resp: 16   Temp: 98.7 °F (37.1 °C)   SpO2: 99%   Body mass index is 32.7 kg/m². Wt Readings from Last 3 Encounters:   11/28/18 202 lb 9.6 oz (91.9 kg)   10/29/18 211 lb (95.7 kg)   10/02/18 216 lb (98 kg)     Physical Exam  Constitutional: appears well-developed and well-nourished. No distress. Pale  HENT:   Head: Normocephalic and atraumatic. Right Ear: Tympanic membrane, external ear and ear canal normal.   Left Ear: Tympanic membrane, external ear and ear canal normal.   Nose: Nose normal.   Mouth/Throat: Oropharynx is clear and moist. No oropharyngeal exudate. Dry mucous membranes   Eyes: Conjunctivae and EOM are normal. Right eye exhibits no discharge. No scleral icterus. Neck: Normal range of motion. Neck supple. Cardiovascular: Normal rate, regular rhythm and normal heart sounds.     Pulmonary/Chest: Effort normal and breath sounds normal. No

## 2018-12-13 LAB
ABSOLUTE BANDS #: ABNORMAL
ABSOLUTE EOS #: 0.17
ABSOLUTE IMMATURE GRANULOCYTE: ABNORMAL
ABSOLUTE LYMPH #: 1.57
ABSOLUTE MONO #: 1.18
BANDS: ABNORMAL
BASOPHILS # BLD: 0.2 10*3/UL
BASOPHILS ABSOLUTE: 0.03 /ΜL
BASOPHILS ABSOLUTE: 0.03 /ΜL
BASOPHILS RELATIVE PERCENT: 0.2 %
EOSINOPHILS ABSOLUTE: 0.17 /ΜL
EOSINOPHILS RELATIVE PERCENT: 1.8
EOSINOPHILS RELATIVE PERCENT: 1.8 %
HCT VFR BLD CALC: 21.9 % (ref 36–46)
HCT VFR BLD CALC: 21.9 % (ref 36–46)
HEMOGLOBIN: 6.3 G/DL (ref 12–16)
HEMOGLOBIN: 6.3 G/DL (ref 12–16)
IMMATURE GRANULOCYTES: ABNORMAL
LYMPHOCYTES # BLD: 16.4 10*3/UL
LYMPHOCYTES ABSOLUTE: 1.57 /ΜL
LYMPHOCYTES RELATIVE PERCENT: 16.4 %
MCH RBC QN AUTO: 19.6 PG
MCH RBC QN AUTO: 19.6 PG
MCHC RBC AUTO-ENTMCNC: 28.8 G/DL
MCHC RBC AUTO-ENTMCNC: 28.8 G/DL
MCV RBC AUTO: 68.2 FL
MCV RBC AUTO: 68.2 FL
MONOCYTES # BLD: 12.3 10*3/UL
MONOCYTES ABSOLUTE: 1.18 /ΜL
MONOCYTES RELATIVE PERCENT: 12.3 %
MORPHOLOGY: ABNORMAL
NEUTROPHILS ABSOLUTE: 6.63 /ΜL
NEUTROPHILS RELATIVE PERCENT: 69.3 %
NRBC AUTOMATED: ABNORMAL
PDW BLD-RTO: 24.9 %
PDW BLD-RTO: 24.9 %
PLATELET # BLD: 368 K/ΜL
PLATELET # BLD: 368 K/ΜL
PLATELET ESTIMATE: ABNORMAL
PMV BLD AUTO: 10.8 FL
PMV BLD AUTO: 10.8 FL
RBC # BLD: 3.21 10^6/ΜL
RBC # BLD: 3.21 10^6/ΜL
RBC # BLD: ABNORMAL 10*6/UL
SEG NEUTROPHILS: 69.3
SEGMENTED NEUTROPHILS ABSOLUTE COUNT: 6.63
WBC # BLD: 9.57 10^3/ML
WBC # BLD: 9.57 10^3/ML
WBC # BLD: ABNORMAL 10*3/UL

## 2018-12-19 ENCOUNTER — TELEPHONE (OUTPATIENT)
Dept: INTERNAL MEDICINE | Age: 48
End: 2018-12-19

## 2018-12-19 NOTE — TELEPHONE ENCOUNTER
Left vm for pt to call the office. Dr Enrique Da Silva received and reviewed chest xray. No pneumonia, Looks like a viral chest infection.

## 2018-12-20 ENCOUNTER — TELEPHONE (OUTPATIENT)
Dept: INTERNAL MEDICINE | Age: 48
End: 2018-12-20

## 2018-12-20 NOTE — TELEPHONE ENCOUNTER
[facility] Initial Follow Up Call    Outreach made within 2 business days of discharge: No-1st attempt    Patient: Chrystal Redman Patient : 1970   MRN: [id number]  Reason for Admission: anemia  Discharge Date: 2018       Spoke with: [facility] to call office    Discharge department/facility: [facility]    Scheduled appointment with PCP within 7-14 days    Follow Up  Future Appointments  Date Time Provider Sagrario Lr   2019 1:00 PM Pricilla Schofield MD [address] [place], [place] [address], [place]

## 2018-12-21 ENCOUNTER — TELEPHONE (OUTPATIENT)
Dept: INTERNAL MEDICINE | Age: 48
End: 2018-12-21

## 2019-02-07 ENCOUNTER — OFFICE VISIT (OUTPATIENT)
Dept: INTERNAL MEDICINE | Age: 49
End: 2019-02-07
Payer: MEDICARE

## 2019-02-07 VITALS
HEART RATE: 91 BPM | SYSTOLIC BLOOD PRESSURE: 138 MMHG | BODY MASS INDEX: 28.64 KG/M2 | WEIGHT: 178.2 LBS | HEIGHT: 66 IN | DIASTOLIC BLOOD PRESSURE: 80 MMHG

## 2019-02-07 DIAGNOSIS — R05.9 COUGH: Primary | ICD-10-CM

## 2019-02-07 DIAGNOSIS — C77.2 COLON CANCER METASTASIZED TO INTRA-ABDOMINAL LYMPH NODE (HCC): ICD-10-CM

## 2019-02-07 DIAGNOSIS — C78.7 COLON CANCER METASTASIZED TO LIVER (HCC): ICD-10-CM

## 2019-02-07 DIAGNOSIS — R11.0 NAUSEA: ICD-10-CM

## 2019-02-07 DIAGNOSIS — C18.4 MALIGNANT NEOPLASM OF TRANSVERSE COLON (HCC): ICD-10-CM

## 2019-02-07 DIAGNOSIS — C18.9 MALIGNANT NEOPLASM OF COLON, UNSPECIFIED PART OF COLON (HCC): ICD-10-CM

## 2019-02-07 DIAGNOSIS — R91.8 LUNG NODULES: ICD-10-CM

## 2019-02-07 DIAGNOSIS — C80.1 CANCER (HCC): ICD-10-CM

## 2019-02-07 DIAGNOSIS — C77.9 MALIGNANT NEOPLASM OF LYMPH NODE (HCC): ICD-10-CM

## 2019-02-07 DIAGNOSIS — C18.9 COLON CANCER METASTASIZED TO INTRA-ABDOMINAL LYMPH NODE (HCC): ICD-10-CM

## 2019-02-07 DIAGNOSIS — C18.9 COLON CANCER METASTASIZED TO LIVER (HCC): ICD-10-CM

## 2019-02-07 PROBLEM — E66.9 OBESITY, CLASS I, BMI 30-34.9: Status: ACTIVE | Noted: 2019-01-02

## 2019-02-07 PROBLEM — N17.9 AKI (ACUTE KIDNEY INJURY) (HCC): Status: ACTIVE | Noted: 2018-12-18

## 2019-02-07 PROBLEM — R00.0 TACHYCARDIA: Status: ACTIVE | Noted: 2018-12-18

## 2019-02-07 PROCEDURE — 1036F TOBACCO NON-USER: CPT | Performed by: FAMILY MEDICINE

## 2019-02-07 PROCEDURE — G8417 CALC BMI ABV UP PARAM F/U: HCPCS | Performed by: FAMILY MEDICINE

## 2019-02-07 PROCEDURE — G8427 DOCREV CUR MEDS BY ELIG CLIN: HCPCS | Performed by: FAMILY MEDICINE

## 2019-02-07 PROCEDURE — G8484 FLU IMMUNIZE NO ADMIN: HCPCS | Performed by: FAMILY MEDICINE

## 2019-02-07 PROCEDURE — 99214 OFFICE O/P EST MOD 30 MIN: CPT | Performed by: FAMILY MEDICINE

## 2019-02-07 RX ORDER — BENZONATATE 100 MG/1
100 CAPSULE ORAL EVERY 6 HOURS PRN
Qty: 30 CAPSULE | Refills: 0 | Status: SHIPPED | OUTPATIENT
Start: 2019-02-07 | End: 2019-02-14

## 2019-02-07 RX ORDER — AZITHROMYCIN 250 MG/1
250 TABLET, FILM COATED ORAL SEE ADMIN INSTRUCTIONS
Qty: 6 TABLET | Refills: 0 | Status: SHIPPED | OUTPATIENT
Start: 2019-02-07 | End: 2019-02-12

## 2019-02-07 RX ORDER — ONDANSETRON 4 MG/1
4 TABLET, ORALLY DISINTEGRATING ORAL EVERY 8 HOURS PRN
Qty: 30 TABLET | Refills: 0 | Status: SHIPPED | OUTPATIENT
Start: 2019-02-07 | End: 2019-03-01

## 2019-02-07 ASSESSMENT — ENCOUNTER SYMPTOMS
CHOKING: 0
APNEA: 0
BLOOD IN STOOL: 0
CHEST TIGHTNESS: 0
CONSTIPATION: 0
NAUSEA: 1

## 2019-02-07 ASSESSMENT — PATIENT HEALTH QUESTIONNAIRE - PHQ9
SUM OF ALL RESPONSES TO PHQ QUESTIONS 1-9: 0
2. FEELING DOWN, DEPRESSED OR HOPELESS: 0
SUM OF ALL RESPONSES TO PHQ QUESTIONS 1-9: 0
SUM OF ALL RESPONSES TO PHQ9 QUESTIONS 1 & 2: 0
1. LITTLE INTEREST OR PLEASURE IN DOING THINGS: 0

## 2019-02-13 ENCOUNTER — TELEPHONE (OUTPATIENT)
Dept: INTERNAL MEDICINE | Age: 49
End: 2019-02-13

## 2019-02-13 DIAGNOSIS — R05.9 COUGH: ICD-10-CM

## 2019-02-23 LAB
HCT VFR BLD CALC: 27.3 % (ref 36.5–46.6)
HEMOGLOBIN: 8.3 G/DL (ref 11.8–15.3)

## 2019-02-25 ENCOUNTER — TELEPHONE (OUTPATIENT)
Dept: INTERNAL MEDICINE | Age: 49
End: 2019-02-25

## 2019-02-25 LAB
ERYTHROCYTE [DISTWIDTH] IN BLOOD BY AUTOMATED COUNT: 26.1 % (ref 12–15.4)
ERYTHROCYTE [DISTWIDTH] IN BLOOD BY AUTOMATED COUNT: 64.7 FL (ref 39.3–48.6)
HCT VFR BLD CALC: 30.3 % (ref 36.5–46.6)
HEMOGLOBIN: 9 G/DL (ref 11.8–15.3)
MCH RBC QN AUTO: 20.9 PG (ref 27.5–33)
MCHC RBC AUTO-ENTMCNC: 29.7 G/DL (ref 30.1–35)
MCV RBC AUTO: 70.3 FL (ref 85.4–100)
NUCLEATED RBCS: 0 /100{WBCS}
PLATELET # BLD: 494 10*3/UL (ref 155–404)
PMV BLD AUTO: 10.4 FL (ref 9.9–12.1)
RBC: 4.31 10*6/UL (ref 3.85–5.1)
RBCS COUNTED: 0 10*3/UL
WBC: 8.9 10*3/UL (ref 4.4–9.9)

## 2019-03-01 ENCOUNTER — OFFICE VISIT (OUTPATIENT)
Dept: INTERNAL MEDICINE | Age: 49
End: 2019-03-01
Payer: MEDICARE

## 2019-03-01 VITALS
DIASTOLIC BLOOD PRESSURE: 72 MMHG | BODY MASS INDEX: 27.87 KG/M2 | WEIGHT: 173.4 LBS | OXYGEN SATURATION: 98 % | HEIGHT: 66 IN | SYSTOLIC BLOOD PRESSURE: 110 MMHG | HEART RATE: 104 BPM

## 2019-03-01 DIAGNOSIS — R11.0 NAUSEA: ICD-10-CM

## 2019-03-01 DIAGNOSIS — R09.82 POST-NASAL DRIP: Primary | ICD-10-CM

## 2019-03-01 DIAGNOSIS — R05.3 PERSISTENT COUGH FOR 3 WEEKS OR LONGER: ICD-10-CM

## 2019-03-01 PROCEDURE — G8417 CALC BMI ABV UP PARAM F/U: HCPCS | Performed by: PHYSICIAN ASSISTANT

## 2019-03-01 PROCEDURE — 1036F TOBACCO NON-USER: CPT | Performed by: PHYSICIAN ASSISTANT

## 2019-03-01 PROCEDURE — 99213 OFFICE O/P EST LOW 20 MIN: CPT | Performed by: PHYSICIAN ASSISTANT

## 2019-03-01 PROCEDURE — G8484 FLU IMMUNIZE NO ADMIN: HCPCS | Performed by: PHYSICIAN ASSISTANT

## 2019-03-01 PROCEDURE — G8427 DOCREV CUR MEDS BY ELIG CLIN: HCPCS | Performed by: PHYSICIAN ASSISTANT

## 2019-03-01 RX ORDER — OLANZAPINE 5 MG/1
5 TABLET ORAL
COMMUNITY
Start: 2019-02-27 | End: 2019-03-01

## 2019-03-01 RX ORDER — LISINOPRIL 20 MG/1
TABLET ORAL
Refills: 1 | COMMUNITY
Start: 2019-02-23 | End: 2019-03-01

## 2019-03-01 RX ORDER — AMOXICILLIN AND CLAVULANATE POTASSIUM 875; 125 MG/1; MG/1
TABLET, FILM COATED ORAL
Refills: 0 | COMMUNITY
Start: 2019-02-23 | End: 2019-03-01

## 2019-03-01 RX ORDER — LISINOPRIL AND HYDROCHLOROTHIAZIDE 25; 20 MG/1; MG/1
TABLET ORAL
Refills: 11 | COMMUNITY
Start: 2019-02-23

## 2019-03-01 RX ORDER — PROMETHAZINE HYDROCHLORIDE 12.5 MG/1
TABLET ORAL
Refills: 1 | COMMUNITY
Start: 2019-02-23 | End: 2019-03-01

## 2019-03-01 RX ORDER — CEPHALEXIN 500 MG/1
500 CAPSULE ORAL 2 TIMES DAILY
Qty: 14 CAPSULE | Refills: 0 | Status: SHIPPED | OUTPATIENT
Start: 2019-03-01 | End: 2019-03-08

## 2019-03-01 RX ORDER — ASCORBATE CALCIUM 500 MG
500 TABLET ORAL
COMMUNITY
Start: 2019-02-27

## 2019-03-01 RX ORDER — CIPROFLOXACIN 500 MG/1
TABLET, FILM COATED ORAL
Refills: 0 | COMMUNITY
Start: 2019-02-18 | End: 2019-03-01

## 2019-03-01 RX ORDER — LORATADINE 10 MG/1
10 TABLET ORAL DAILY
Qty: 30 TABLET | Refills: 0 | Status: SHIPPED | OUTPATIENT
Start: 2019-03-01

## 2019-03-01 RX ORDER — PROCHLORPERAZINE MALEATE 10 MG
TABLET ORAL
Refills: 3 | COMMUNITY
Start: 2019-02-13 | End: 2019-03-01

## 2019-03-01 RX ORDER — PROMETHAZINE HYDROCHLORIDE 12.5 MG/1
12.5 TABLET ORAL
COMMUNITY
Start: 2019-02-27 | End: 2019-03-01

## 2019-03-01 ASSESSMENT — ENCOUNTER SYMPTOMS: COUGH: 1

## 2019-03-05 ASSESSMENT — ENCOUNTER SYMPTOMS
WHEEZING: 0
SORE THROAT: 0
SHORTNESS OF BREATH: 0
RHINORRHEA: 1